# Patient Record
Sex: MALE | Race: WHITE | Employment: FULL TIME | URBAN - METROPOLITAN AREA
[De-identification: names, ages, dates, MRNs, and addresses within clinical notes are randomized per-mention and may not be internally consistent; named-entity substitution may affect disease eponyms.]

---

## 2023-10-06 ENCOUNTER — HOSPITAL ENCOUNTER (OUTPATIENT)
Dept: CV DIAGNOSTICS | Facility: HOSPITAL | Age: 58
Discharge: HOME | End: 2023-10-06
Attending: INTERNAL MEDICINE
Payer: COMMERCIAL

## 2023-10-06 DIAGNOSIS — R94.31 ABNORMAL EKG: ICD-10-CM

## 2023-10-06 PROCEDURE — 93306 TTE W/DOPPLER COMPLETE: CPT | Performed by: INTERNAL MEDICINE

## 2023-11-16 ENCOUNTER — OFFICE VISIT (OUTPATIENT)
Dept: SLEEP CENTER | Age: 58
End: 2023-11-16
Attending: INTERNAL MEDICINE
Payer: COMMERCIAL

## 2023-11-16 DIAGNOSIS — R06.83 SNORING: Primary | ICD-10-CM

## 2023-11-16 PROCEDURE — 95810 POLYSOM 6/> YRS 4/> PARAM: CPT

## 2024-06-09 ENCOUNTER — APPOINTMENT (OUTPATIENT)
Dept: CT IMAGING | Facility: HOSPITAL | Age: 59
End: 2024-06-09
Attending: EMERGENCY MEDICINE
Payer: COMMERCIAL

## 2024-06-09 ENCOUNTER — HOSPITAL ENCOUNTER (EMERGENCY)
Facility: HOSPITAL | Age: 59
Discharge: HOME | End: 2024-06-09
Attending: EMERGENCY MEDICINE
Payer: COMMERCIAL

## 2024-06-09 VITALS
SYSTOLIC BLOOD PRESSURE: 129 MMHG | RESPIRATION RATE: 18 BRPM | DIASTOLIC BLOOD PRESSURE: 89 MMHG | HEART RATE: 56 BPM | WEIGHT: 198.44 LBS | TEMPERATURE: 97 F | OXYGEN SATURATION: 98 %

## 2024-06-09 DIAGNOSIS — K42.9 REDUCIBLE UMBILICAL HERNIA: Primary | ICD-10-CM

## 2024-06-09 LAB
ALBUMIN SERPL-MCNC: 4.1 G/DL (ref 3.4–5)
ALBUMIN/GLOB SERPL: 1 {RATIO} (ref 1–2)
ALP LIVER SERPL-CCNC: 63 U/L
ALT SERPL-CCNC: 29 U/L
ANION GAP SERPL CALC-SCNC: 6 MMOL/L (ref 0–18)
AST SERPL-CCNC: 26 U/L (ref 15–37)
BASOPHILS # BLD AUTO: 0.04 X10(3) UL (ref 0–0.2)
BASOPHILS NFR BLD AUTO: 0.4 %
BILIRUB SERPL-MCNC: 0.6 MG/DL (ref 0.1–2)
BUN BLD-MCNC: 13 MG/DL (ref 9–23)
CALCIUM BLD-MCNC: 9.2 MG/DL (ref 8.5–10.1)
CHLORIDE SERPL-SCNC: 106 MMOL/L (ref 98–112)
CO2 SERPL-SCNC: 26 MMOL/L (ref 21–32)
CREAT BLD-MCNC: 0.91 MG/DL
EGFRCR SERPLBLD CKD-EPI 2021: 98 ML/MIN/1.73M2 (ref 60–?)
EOSINOPHIL # BLD AUTO: 0.03 X10(3) UL (ref 0–0.7)
EOSINOPHIL NFR BLD AUTO: 0.3 %
ERYTHROCYTE [DISTWIDTH] IN BLOOD BY AUTOMATED COUNT: 15.1 %
GLOBULIN PLAS-MCNC: 4.1 G/DL (ref 2.8–4.4)
GLUCOSE BLD-MCNC: 119 MG/DL (ref 70–99)
HCT VFR BLD AUTO: 46.5 %
HGB BLD-MCNC: 15.4 G/DL
IMM GRANULOCYTES # BLD AUTO: 0.05 X10(3) UL (ref 0–1)
IMM GRANULOCYTES NFR BLD: 0.5 %
LIPASE SERPL-CCNC: 35 U/L (ref 13–75)
LYMPHOCYTES # BLD AUTO: 1.35 X10(3) UL (ref 1–4)
LYMPHOCYTES NFR BLD AUTO: 14.1 %
MCH RBC QN AUTO: 27.3 PG (ref 26–34)
MCHC RBC AUTO-ENTMCNC: 33.1 G/DL (ref 31–37)
MCV RBC AUTO: 82.4 FL
MONOCYTES # BLD AUTO: 0.76 X10(3) UL (ref 0.1–1)
MONOCYTES NFR BLD AUTO: 7.9 %
NEUTROPHILS # BLD AUTO: 7.33 X10 (3) UL (ref 1.5–7.7)
NEUTROPHILS # BLD AUTO: 7.33 X10(3) UL (ref 1.5–7.7)
NEUTROPHILS NFR BLD AUTO: 76.8 %
OSMOLALITY SERPL CALC.SUM OF ELEC: 287 MOSM/KG (ref 275–295)
PLATELET # BLD AUTO: 232 10(3)UL (ref 150–450)
POTASSIUM SERPL-SCNC: 3.6 MMOL/L (ref 3.5–5.1)
PROT SERPL-MCNC: 8.2 G/DL (ref 6.4–8.2)
RBC # BLD AUTO: 5.64 X10(6)UL
SODIUM SERPL-SCNC: 138 MMOL/L (ref 136–145)
WBC # BLD AUTO: 9.6 X10(3) UL (ref 4–11)

## 2024-06-09 PROCEDURE — 96361 HYDRATE IV INFUSION ADD-ON: CPT

## 2024-06-09 PROCEDURE — 74177 CT ABD & PELVIS W/CONTRAST: CPT | Performed by: EMERGENCY MEDICINE

## 2024-06-09 PROCEDURE — 83690 ASSAY OF LIPASE: CPT | Performed by: EMERGENCY MEDICINE

## 2024-06-09 PROCEDURE — 96374 THER/PROPH/DIAG INJ IV PUSH: CPT

## 2024-06-09 PROCEDURE — 85025 COMPLETE CBC W/AUTO DIFF WBC: CPT | Performed by: EMERGENCY MEDICINE

## 2024-06-09 PROCEDURE — 99284 EMERGENCY DEPT VISIT MOD MDM: CPT

## 2024-06-09 PROCEDURE — 80053 COMPREHEN METABOLIC PANEL: CPT | Performed by: EMERGENCY MEDICINE

## 2024-06-09 PROCEDURE — 96375 TX/PRO/DX INJ NEW DRUG ADDON: CPT

## 2024-06-09 PROCEDURE — 99285 EMERGENCY DEPT VISIT HI MDM: CPT

## 2024-06-09 RX ORDER — ONDANSETRON 2 MG/ML
4 INJECTION INTRAMUSCULAR; INTRAVENOUS
Status: DISCONTINUED | OUTPATIENT
Start: 2024-06-09 | End: 2024-06-09

## 2024-06-09 RX ORDER — SODIUM CHLORIDE 9 MG/ML
INJECTION, SOLUTION INTRAVENOUS CONTINUOUS
Status: DISCONTINUED | OUTPATIENT
Start: 2024-06-09 | End: 2024-06-09

## 2024-06-09 RX ORDER — HYDROMORPHONE HYDROCHLORIDE 1 MG/ML
0.5 INJECTION, SOLUTION INTRAMUSCULAR; INTRAVENOUS; SUBCUTANEOUS EVERY 30 MIN PRN
Status: DISCONTINUED | OUTPATIENT
Start: 2024-06-09 | End: 2024-06-09

## 2024-06-09 NOTE — ED PROVIDER NOTES
Patient Seen in: [facility] Emergency Department      History     Chief Complaint   Patient presents with    Abdomen/Flank Pain     Stated Complaint: abdominal pain x 2hours    Subjective:   HPI    Patient has a known umbilical hernia.  He has a referral for a general surgeon but has not seen this doctor yet.  Today while sitting down in [place], he felt severe pain at the umbilicus.  He now feels generally nauseous and has generalized abdominal pain.  This started just about 2 hours ago.  Patient notes that he can usually push the hernia back into his bellybutton but is unable to do so today.    Objective:   No pertinent past medical history.            No pertinent past surgical history.              No pertinent social history.            Review of Systems    Positive for stated complaint: abdominal pain x 2hours  Other systems are as noted in HPI.  Constitutional and vital signs reviewed.      All other systems reviewed and negative except as noted above.    Physical Exam     ED Triage Vitals [06/09/24 1626]   BP (!) 170/93   Pulse 57   Resp 18   Temp 97.3 °F (36.3 °C)   Temp src    SpO2 98 %   O2 Device None (Room air)       Current Vitals:   Vital Signs  BP: 129/89  Pulse: 56  Resp: 18  Temp: 97.3 °F (36.3 °C)  MAP (mmHg): 100    Oxygen Therapy  SpO2: 98 %  O2 Device: None (Room air)            Physical Exam  General: The patient is awake, alert, conversant.   Eyes: sclera white.  Lids and lashes are normal.  Abdomen: Soft, nondistended.  There is diffuse tenderness in all quadrants with maximal discomfort overlying a protuberant umbilical hernia.  Extremities: Unremarkable.  Calves nonswollen, symmetric  Skin: Without rash  Neurologic:  Mental status as above.  Patient moves all extremities with good strength and coordination.           ED Course     Labs Reviewed   COMP METABOLIC PANEL (14) - Abnormal; Notable for the following components:       Result Value    Glucose 119 (*)     All other components  within normal limits   LIPASE - Normal   CBC WITH DIFFERENTIAL WITH PLATELET    Narrative:     The following orders were created for panel order CBC With Differential With Platelet.  Procedure                               Abnormality         Status                     ---------                               -----------         ------                     CBC W/ DIFFERENTIAL[[id number]]                              Final result                 Please view results for these tests on the individual orders.   CBC W/ DIFFERENTIAL                      MDM      Patient has a quite protuberant umbilical hernia with diffuse abdominal tenderness starting just 2 hours ago  With patient relaxed and with gentle pressure over the hernia, I did feel that it reduced.  Patient having some improvement of his symptoms as well.    CBC unremarkable  Metabolic panel unremarkable including LFTs and lipase    CT abdomen pelvis  I personally reviewed the actual radiographs themselves and my individual interpretation umbilical hernia with no bowel incarceration  radiologist's formal interpretation which I have reviewed  CONCLUSION:    1. There are a few scattered prominent small bowel loops with air-fluid levels that could be due to nonspecific enteritis or ileus.  Clinical correlation recommended.   2. Small umbilical hernia containing minimal fluid.       On repeat examination, the hernia remained reduced.  At this point, I believe patient may be safely discharged home.  I reviewed the results of workup with him.    I recommend:  Contact the surgeon you had been referred to first thing in the morning to arrange follow-up     Rest.  No lifting more than 8 pounds  Start over-the-counter fiber supplement like Metamucil or Citrucel with plenty of water and stool softener like Colace to prevent constipation  Tylenol or Motrin for pain     Patient requested a surgeon on staff at this hospital.  He was provided the on-call surgeons office  information                           Medical Decision Making      Disposition and Plan     Clinical Impression:  1. Reducible umbilical hernia         Disposition:  Discharge  6/9/2024  6:36 pm    Follow-up:  Karlo Soto MD  [address] [facility] [id number] [phone]    Follow up      Blake Simmons MD  [address] [facility] [id number] [phone]    Call  As needed          Medications Prescribed:  There are no discharge medications for this patient.

## 2024-06-09 NOTE — DISCHARGE INSTRUCTIONS
Contact the surgeon you had been referred to first thing in the morning to arrange follow-up    Rest.  No lifting more than 8 pounds  Start over-the-counter fiber supplement like Metamucil or Citrucel with plenty of water and stool softener like Colace to prevent constipation  Tylenol or Motrin for pain  You may apply a cool compress to the area 20 minutes at a time

## 2024-06-09 NOTE — ED INITIAL ASSESSMENT (HPI)
Pt arrives with abdominal pain. Pt has a known umbilical hernia. Increased pain since 1400. Nausea, denies vomiting. Last BM this am.

## 2024-06-13 ENCOUNTER — OFFICE VISIT (OUTPATIENT)
Facility: LOCATION | Age: 59
End: 2024-06-13
Payer: COMMERCIAL

## 2024-06-13 VITALS — OXYGEN SATURATION: 96 % | HEART RATE: 67 BPM | TEMPERATURE: 98 F

## 2024-06-13 DIAGNOSIS — K42.9 UMBILICAL HERNIA WITHOUT OBSTRUCTION AND WITHOUT GANGRENE: ICD-10-CM

## 2024-06-13 DIAGNOSIS — K40.20 NON-RECURRENT BILATERAL INGUINAL HERNIA WITHOUT OBSTRUCTION OR GANGRENE: Primary | ICD-10-CM

## 2024-06-13 PROCEDURE — 99244 OFF/OP CNSLTJ NEW/EST MOD 40: CPT | Performed by: SURGERY

## 2024-06-13 NOTE — H&P
New Patient Visit Note       Active Problems      1. Non-recurrent bilateral inguinal hernia without obstruction or gangrene    2. Umbilical hernia without obstruction and without gangrene        Chief Complaint   Chief Complaint   Patient presents with    New Patient     NP - 6/9 ER f/u Reducible umbilical hernia         History of Present Illness     The patient presents at the request of his primary care physician for evaluation of bilateral inguinal and umbilical hernia.  Patient has a known history of hernias and especially the umbilical hernia.  Usually the patient was able to reduce his umbilical hernia but recently he experienced pain discomfort and prominence of the umbilical hernia and he could not reduce the hernia.  The patient presented to the emergency room with a hernia was reduced after CT scan of the abdomen pelvis was completed.  Imaging confirmed small fat-containing umbilical hernia as well as bilateral inguinal hernias.  I reviewed the images I concur with radiologist interpretation.  I discussed the findings with the patient in context of his presenting symptoms.    The patient denies fever, chills, chest pain, shortness of breath, dyspnea. The patient also denies hematemesis, melena, or hematochezia. The patient denies change in bowel or bladder habits. There is no complaint of hematuria or dysuria.    I discussed the risk, benefits, alternatives of surgery.  I discussed the anticipated postoperative recovery including dietary, activity, exercise, and lifestyle recommendations.  The patient's questions regarding surgical procedure were answered and the patient voiced understanding of the care plan.    Allergies  Darryn is allergic to pennsaid.    Past Medical / Surgical / Social / Family History    The past medical and past surgical history have been reviewed by me today.    History reviewed. No pertinent past medical history.  History reviewed. No pertinent surgical history.    The family  history and social history have been reviewed by me today.    History reviewed. No pertinent family history.  Social History     Socioeconomic History    Marital status:    Substance and Sexual Activity    Alcohol use: Never    Drug use: Never      No current outpatient medications on file.      Review of Systems  The Review of Systems has been reviewed by me during today.  Review of Systems    Physical Findings   Pulse 67   Temp 97.6 °F (36.4 °C)   SpO2 96%   Physical Exam  Vitals and nursing note reviewed.   Constitutional:       General: He is not in acute distress.     Appearance: He is well-developed.   HENT:      Head: Normocephalic and atraumatic.   Eyes:      General: No scleral icterus.     Pupils: Pupils are equal, round, and reactive to light.   Neck:      Vascular: No JVD.      Trachea: No tracheal deviation.   Cardiovascular:      Rate and Rhythm: Normal rate and regular rhythm.   Pulmonary:      Effort: Pulmonary effort is normal. No respiratory distress.      Breath sounds: No stridor.   Abdominal:      General: Bowel sounds are normal. There is no distension.      Palpations: Abdomen is soft. Abdomen is not rigid. There is no mass.      Tenderness: There is no abdominal tenderness. There is no guarding or rebound. Negative signs include Mathew's sign and McBurney's sign.      Hernia: A hernia is present. Hernia is present in the umbilical area, left inguinal area and right inguinal area.       Genitourinary:     Penis: Normal. No phimosis, paraphimosis, hypospadias, erythema, tenderness, discharge, swelling or lesions.       Testes: Normal.         Right: Mass, tenderness, swelling, testicular hydrocele or varicocele not present. Right testis is descended. Cremasteric reflex is present.          Left: Mass, tenderness, swelling, testicular hydrocele or varicocele not present. Left testis is descended. Cremasteric reflex is present.       Epididymis:      Right: Normal.      Left: Normal.    Musculoskeletal:         General: Normal range of motion.      Cervical back: Normal range of motion and neck supple.   Skin:     General: Skin is warm and dry.   Neurological:      Mental Status: He is alert and oriented to person, place, and time.   Psychiatric:         Behavior: Behavior normal.             Assessment   1. Non-recurrent bilateral inguinal hernia without obstruction or gangrene    2. Umbilical hernia without obstruction and without gangrene          Plan     The patient will be scheduled for robotic repair of bilateral inguinal hernias with mesh and umbilical hernia repair-with mesh as well.    The stevie-operative care plan was discussed with the patient, who voices understanding.  Activity and lifting recommendations were discussed in length.     The risks, benefits, and alternatives to the procedure were explained to the patient.  The risks explained include, but are not limited to, bleeding, infection, pain wound complications, recurrence, incorrect diagnosis, injury to adjacent organs and structures. We also discussed the possibile need for further therapeutic, diagnostic, or surgical intervention.  The patient voiced understanding, and after all questions were answered to the patient's satisfaction, the patient provided willing and informed consent to proceed.     No orders of the defined types were placed in this encounter.      Imaging & Referrals   None    Follow Up  No follow-ups on file.    Scotty Duffy MD

## 2024-09-09 ENCOUNTER — TELEPHONE (OUTPATIENT)
Facility: LOCATION | Age: 59
End: 2024-09-09

## 2024-09-09 NOTE — TELEPHONE ENCOUNTER
SHERRI JUNG Patient  Member ID  [id number]    Date of Birth  1965-09-28    Gender  NA    Transaction Type  Outpatient Authorization    Organization  [facility]    Payer  [facility] logo     Certificate Information  Reference Number  [id number]    Status  NO ACTION REQUIRED    Message  Requested Service does not require preauthorization. We would strongly encourage you to check benefits for this service.    Member Information  Patient Name  SHERRI JUNG    Patient Date of Birth  1965-09-28    Member ID  [id number]    Relationship to Subscriber  Self    Subscriber Name  SHERRI JUNG    Requesting Provider     Name  HUMBERTOJANETS ROSA    NPI  [id number]    Tax Id  [id number]    Specialty  [id number]  Provider Role  Provider    Address  [address] [place], [place] [address]    Phone  [phone]  Fax  [phone]    Contact Name  ROCÍO MARIA    Service Information  Service Type  2 - Surgical    Place of Service  22 - [facility]    Service From - To Date  2024-09-23 - 2024-12-31    Level of Service  Elective    Diagnosis Code 1  K429 - Umbilical hernia without obstruction or gangrene    Diagnosis Code 2   - Unil inguinal hernia w/o obst or gangr not spcf as recur    Procedure Code 1 (CPT/HCPCS)  86740 - LAP ING HERNIA REPAIR INIT    Quantity  1 Units    Status  NO ACTION REQUIRED    Procedure Code 2 (CPT/HCPCS)  98526 - RPR AA HRN 1ST < 3 CM RDC    Quantity  1 Units    Status  NO ACTION REQUIRED

## 2024-09-23 NOTE — ANESTHESIA PROCEDURE NOTES
Airway  Date/Time: 9/23/2024 9:33 AM  Urgency: elective      General Information and Staff    Patient location during procedure: OR  Anesthesiologist: Calixto Awan MD  Resident/CRNA: Ryan Martines CRNA  Performed: CRNA   Performed by: Ryan Martines CRNA  Authorized by: Calixto Awan MD      Indications and Patient Condition  Indications for airway management: anesthesia  Sedation level: deep  Preoxygenated: yes  Patient position: sniffing  Mask difficulty assessment: 1 - vent by mask    Final Airway Details  Final airway type: endotracheal airway      Successful airway: ETT  Cuffed: yes   Successful intubation technique: direct laryngoscopy  Endotracheal tube insertion site: oral  Blade: Harish  Blade size: #4  ETT size (mm): 8.0    Cormack-Lehane Classification: grade I - full view of glottis  Placement verified by: capnometry   Measured from: lips  ETT to lips (cm): 22  Number of attempts at approach: 1

## 2024-09-23 NOTE — ANESTHESIA PREPROCEDURE EVALUATION
PRE-OP EVALUATION    Patient Name: Darryn Almeida    Admit Diagnosis: Non-recurrent bilateral inguinal hernia without obstruction or gangrene [K40.20]  Umbilical hernia without obstruction and without gangrene [K42.9]    Pre-op Diagnosis: Non-recurrent bilateral inguinal hernia without obstruction or gangrene [K40.20]  Umbilical hernia without obstruction and without gangrene [K42.9]    ROBOTIC BILATERAL LAPAROSCOPIC INGUINAL HERNIA REPAIR WITH MESH, UMBILICAL HERNIORRHAPHY WITH MESH    Anesthesia Procedure: ROBOTIC BILATERAL LAPAROSCOPIC INGUINAL HERNIA REPAIR WITH MESH, UMBILICAL HERNIORRHAPHY WITH MESH (Abdomen)    Surgeons and Role:     * Scotty Duffy MD - Primary    Pre-op vitals reviewed.  Temp: 97.7 °F (36.5 °C)  Pulse: 69  Resp: 16  BP: 137/86  SpO2: 99 %  Body mass index is 28.59 kg/m².    Current medications reviewed.  Hospital Medications:  • [Transfer Hold] acetaminophen (Tylenol Extra Strength) tab 1,000 mg  1,000 mg Oral Once   • [Transfer Hold] scopolamine (Transderm-Scop) 1 MG/3DAYS patch 1 patch  1 patch Transdermal Once   • lactated ringers infusion   Intravenous Continuous   • [COMPLETED] heparin (Porcine) 5000 UNIT/ML injection 5,000 Units  5,000 Units Subcutaneous Once   • ceFAZolin (Ancef) 2g in 10mL IV syringe premix  2 g Intravenous Once       Outpatient Medications:     No medications prior to admission.       Allergies: Penicillins      Anesthesia Evaluation    Patient summary reviewed.    Anesthetic Complications  (-) history of anesthetic complications         GI/Hepatic/Renal    Negative GI/hepatic/renal ROS.                             Cardiovascular    Negative cardiovascular ROS.  ECG reviewed.  Exercise tolerance: good     MET: >4                                           Endo/Other    Negative endo/other ROS.                              Pulmonary    Negative pulmonary ROS.                       Neuro/Psych                                  Past Surgical History:    Procedure Laterality Date   • Spine surgery procedure unlisted  2011     Social History     Socioeconomic History   • Marital status:    Tobacco Use   • Smoking status: Never   • Smokeless tobacco: Never   Vaping Use   • Vaping status: Never Used   Substance and Sexual Activity   • Alcohol use: Yes     Comment: 1.MONTH   • Drug use: Never     History   Drug Use Unknown     Available pre-op labs reviewed.               Airway      Mallampati: II  Mouth opening: >3 FB  TM distance: > 6 cm  Neck ROM: full Cardiovascular    Cardiovascular exam normal.  Rhythm: regular  Rate: normal     Dental             Pulmonary    Pulmonary exam normal.  Breath sounds clear to auscultation bilaterally.               Other findings        ASA: 1   Plan: general  NPO status verified and patient meets guidelines.    Post-procedure pain management plan discussed with surgeon and patient.      Plan/risks discussed with: patient            Present on Admission:  **None**

## 2024-09-23 NOTE — H&P
New Patient Visit Note         History of Present Illness     The patient presents at the request of his primary care physician for evaluation of bilateral inguinal and umbilical hernia.  Patient has a known history of hernias and especially the umbilical hernia.  Usually the patient was able to reduce his umbilical hernia but recently he experienced pain discomfort and prominence of the umbilical hernia and he could not reduce the hernia.  The patient presented to the emergency room with a hernia was reduced after CT scan of the abdomen pelvis was completed.  Imaging confirmed small fat-containing umbilical hernia as well as bilateral inguinal hernias.  I reviewed the images I concur with radiologist interpretation.  I discussed the findings with the patient in context of his presenting symptoms.    The patient denies fever, chills, chest pain, shortness of breath, dyspnea. The patient also denies hematemesis, melena, or hematochezia. The patient denies change in bowel or bladder habits. There is no complaint of hematuria or dysuria.    I discussed the risk, benefits, alternatives of surgery.  I discussed the anticipated postoperative recovery including dietary, activity, exercise, and lifestyle recommendations.  The patient's questions regarding surgical procedure were answered and the patient voiced understanding of the care plan.    Allergies  Darryn is allergic to penicillins.    Past Medical / Surgical / Social / Family History    The past medical and past surgical history have been reviewed by me today.    Past Medical History:    Back problem     Past Surgical History:   Procedure Laterality Date    Spine surgery procedure unlisted  2011       The family history and social history have been reviewed by me today.    History reviewed. No pertinent family history.  Social History     Socioeconomic History    Marital status:    Tobacco Use    Smoking status: Never    Smokeless tobacco: Never   Vaping Use     Vaping status: Never Used   Substance and Sexual Activity    Alcohol use: Yes     Comment: 1.MONTH    Drug use: Never      No current outpatient medications on file.      Review of Systems  The Review of Systems has been reviewed by me during today.  Review of Systems    Physical Findings   /86 (BP Location: Right arm)   Pulse 69   Temp 97.7 °F (36.5 °C) (Temporal)   Resp 16   Ht 71\"   Wt 205 lb (93 kg)   SpO2 99%   BMI 28.59 kg/m²   Physical Exam  Vitals and nursing note reviewed.   Constitutional:       General: He is not in acute distress.     Appearance: He is well-developed.   HENT:      Head: Normocephalic and atraumatic.   Eyes:      General: No scleral icterus.     Pupils: Pupils are equal, round, and reactive to light.   Neck:      Vascular: No JVD.      Trachea: No tracheal deviation.   Cardiovascular:      Rate and Rhythm: Normal rate and regular rhythm.   Pulmonary:      Effort: Pulmonary effort is normal. No respiratory distress.      Breath sounds: No stridor.   Abdominal:      General: Bowel sounds are normal. There is no distension.      Palpations: Abdomen is soft. Abdomen is not rigid. There is no mass.      Tenderness: There is no abdominal tenderness. There is no guarding or rebound. Negative signs include Mathew's sign and McBurney's sign.      Hernia: A hernia is present. Hernia is present in the umbilical area, left inguinal area and right inguinal area.       Genitourinary:     Penis: Normal. No phimosis, paraphimosis, hypospadias, erythema, tenderness, discharge, swelling or lesions.       Testes: Normal.         Right: Mass, tenderness, swelling, testicular hydrocele or varicocele not present. Right testis is descended. Cremasteric reflex is present.          Left: Mass, tenderness, swelling, testicular hydrocele or varicocele not present. Left testis is descended. Cremasteric reflex is present.       Epididymis:      Right: Normal.      Left: Normal.   Musculoskeletal:          General: Normal range of motion.      Cervical back: Normal range of motion and neck supple.   Skin:     General: Skin is warm and dry.   Neurological:      Mental Status: He is alert and oriented to person, place, and time.   Psychiatric:         Behavior: Behavior normal.             Assessment     Bilateral Inguinal hernias  Umbilical hernia        Plan     The patient will be scheduled for robotic repair of bilateral inguinal hernias with mesh and umbilical hernia repair-with mesh as well.    The stevie-operative care plan was discussed with the patient, who voices understanding.  Activity and lifting recommendations were discussed in length.     The risks, benefits, and alternatives to the procedure were explained to the patient.  The risks explained include, but are not limited to, bleeding, infection, pain wound complications, recurrence, incorrect diagnosis, injury to adjacent organs and structures. We also discussed the possibile need for further therapeutic, diagnostic, or surgical intervention.  The patient voiced understanding, and after all questions were answered to the patient's satisfaction, the patient provided willing and informed consent to proceed.     Scotty Duffy MD

## 2024-09-23 NOTE — BRIEF OP NOTE
Pre-Operative Diagnosis: Non-recurrent bilateral inguinal hernia without obstruction or gangrene [K40.20]  Umbilical hernia without obstruction and without gangrene [K42.9]     Post-Operative Diagnosis: Non-recurrent bilateral inguinal hernia without obstruction or gangrene [K40.20]Umbilical hernia without obstruction and without gangrene [K42.9]      Procedure Performed:   ROBOTIC BILATERAL LAPAROSCOPIC INGUINAL HERNIA REPAIR WITH MESH, VENTRAL HERNIA REPAIR WITH MESH, UMBILICAL HERNIORRHAPHY    Surgeons and Role:     * Scotty Duffy MD - Primary    Assistant(s):  PA: Halle Meglar PA     Surgical Findings: Bilateral incarcerated indirect inguinal hernias with large lipomas in the inguinal canal.  Recurrent, incarcerated umbilical hernia with omentum in the hernia sac.  Aperture 3 cm diameter.     Specimen: Hernia sac and contents     Estimated Blood Loss: Blood Output: 15 mL (9/23/2024 11:50 AM)      Dictation Number:  [id number]    Scotty Duffy MD  9/23/2024  11:57 AM

## 2024-09-23 NOTE — ANESTHESIA POSTPROCEDURE EVALUATION
[facility]    Darryn Almeida Patient Status:  Hospital Outpatient Surgery   Age/Gender 58 year old male MRN [id number]   Location [facility] SURGERY Attending Scotty Duffy MD   Hosp Day # 0 PCP Karlo Soto MD       Anesthesia Post-op Note    ROBOTIC BILATERAL LAPAROSCOPIC INGUINAL HERNIA REPAIR WITH MESH, UMBILICAL HERNIORRHAPHY WITH MESH    Procedure Summary       Date: 09/23/24 Room / Location:  MAIN OR 09 / [facility] MAIN OR    Anesthesia Start: 0927 Anesthesia Stop: 1214    Procedure: ROBOTIC BILATERAL LAPAROSCOPIC INGUINAL HERNIA REPAIR WITH MESH, UMBILICAL HERNIORRHAPHY WITH MESH (Abdomen) Diagnosis:       Non-recurrent bilateral inguinal hernia without obstruction or gangrene      Umbilical hernia without obstruction and without gangrene      (Non-recurrent bilateral inguinal hernia without obstruction or gangrene [K40.20]Umbilical hernia without obstruction and without gangrene [K42.9])    Surgeons: Scotty Duffy MD Anesthesiologist: Calixto Awan MD    Anesthesia Type: general ASA Status: 1            Anesthesia Type: general    Vitals Value Taken Time   /77 09/23/24 1216   Temp 98.2 09/23/24 1219   Pulse 78 09/23/24 1219   Resp 16 09/23/24 1219   SpO2 96 % 09/23/24 1219   Vitals shown include unfiled device data.    Patient Location: PACU    Anesthesia Type: general    Airway Patency: patent    Postop Pain Control: adequate    Mental Status: mildly sedated but able to meaningfully participate in the post-anesthesia evaluation    Nausea/Vomiting: none    Cardiopulmonary/Hydration status: stable euvolemic    Complications: no apparent anesthesia related complications    Postop vital signs: stable    Dental Exam: Unchanged from Preop    Patient to be discharged from PACU when criteria met.

## 2024-09-23 NOTE — DISCHARGE INSTRUCTIONS
Home Care Instructions  Hernia Repair  Dr Scotty Duffy    MEDICATIONS  For post-operative pain control the medication is usually Oxycodone. This is a narcotic and is best taken by starting with one tablet and repeating every four to six hours as needed. If the patient does not feel they need the narcotics they shouldn’t take them. If the pain is severe the patient may take up to two pills every four hours. If a minor supplement is necessary in addition to the narcotics please supplement with Tylenol and Advil (ibuprofen) or Motrin. Take care not to exceed 4 g (4000 mg) of Tylenol per day. Please ask your surgeon before resuming blood thinners such as aspirin, plavix or coumadin. All other home medications may be resumed as scheduled.    DIET  The patient may resume a general diet immediately. This is not a good time to eat excessively. The patient should eat in moderation and stick with foods the patient feels are easy to digest. There should be no alcohol consumption in the immediate recover time period or within six hours of taking narcotics.    WOUND CARE  The top dressing may be removed the day after surgery. This includes the gauze, tape and band-aids if they are present. Do not remove the steri-strips or butterfly tapes that are white and adherent to the skin. The steri-strips will eventually peel up at the ends and at this point they may be removed. This is usually seven to ten days after surgery. The patient may shower the day after surgery. There is no need to cover the incisions and all top gauze type dressing should be removed prior to showering. Soap can get on the wounds but do not scrub over the wounds. No hair dye or chemicals of any kind should get in the wounds. Avoid tub baths for two weeks. If visible sutures or staples are present they will be removed in the office by the surgeon or nurse. Most wounds will be closed with dissolving suture underneath the skin. These sutures will dissolve on  their own.    ACTIVITY  Every day the patient should be up, showered and dressed. Each day the patient should be up and around the house. The patient should not lie in bed and should not stay in pajamas. We count on the patient being up, coughing, walking and deep breathing to avoid pneumonia and blood clots in the legs. Once a day the patient should get out of the house and go shopping, go to the mall, the hardware store, the movies or a restaurant. The patient may ride in a car but should not drive the car for at least one week. Patients should be off narcotics for at least 8 hours prior to being the . The average time off work is 10 to 14 days; most adults will be seeing the surgeon prior to returning to work. Patients may go up and down stairs and lift up to five pounds but no bending, pushing or pulling. Nothing called work or exercise until the follow up visit. No ‘stair-master’ poser walking, jogging or workout until the follow up visit. Patients should seek further activity limits at the time of their appointment. If the patient is unable to urinate and feels a painful and full bladder call us immediately.    APPOINTMENT  Please call our office today for an appointment within five to ten days of discharge Any fever greater than 100.5, chills, nausea, vomiting, or severe diarrhea please call our office. If the wound turns red, hot, swollen, becomes increasingly painful, or drains pus call us immediately at [phone]. For life threatening emergencies call 911. For non-emergent care please call our office after 9:30 a.m. Monday through Saturday. The number listed above is our office number, our phone automatically switches to out answering service if we are not there. Please do not use the emergency room for nonurgent care.      Thank you for entrusting me with your care.     You received a drug called Toradol which is an Anti Inflammatory at: 11:45 am     Do not take any Anti Inflammatory like  Motrin, Advil, Aleve or Ibuprofen until after: 5:45 pm   Please report any suspected allergic reactions or bleeding issues to your doctor

## 2024-09-23 NOTE — OPERATIVE REPORT
[facility]    PATIENT'S NAME: SHERRI PRINCE   ATTENDING PHYSICIAN: Scotty Duffy M.D.   OPERATING PHYSICIAN: Scotty Duffy M.D.   PATIENT ACCOUNT#:   [id number]    LOCATION:  PACU  PACU 6 [facility]  MEDICAL RECORD #:   [id number]       YOB: 1965  ADMISSION DATE:       09/23/2024      OPERATION DATE:  09/23/2024    OPERATIVE REPORT    PREOPERATIVE DIAGNOSIS:    1.   Bilateral incarcerated inguinal hernia.  2.   Recurrent incarcerated umbilical hernia.  POSTOPERATIVE DIAGNOSIS:    1.   Bilateral incarcerated inguinal hernia.  2.   Recurrent incarcerated umbilical hernia.  PROCEDURE:    1.   Robotic repair of incarcerated bilateral inguinal hernias with mesh.  2.   Robotic ventral/umbilical hernia repair with mesh (3 cm diameter aperture).     ASSISTANT:  Halle Melgar PA-C    ANESTHESIA:  General endotracheal anesthesia.     ANESTHESIOLOGIST:  Mr. Bobbi CRNA    BLOOD LOSS:  Approximately 15 mL.    COMPLICATIONS:  None apparent.    SPECIMENS:  Umbilical hernia sac and contents (omentum).    DISPOSITION:  The patient awakened from anesthesia and brought to Recovery in stable condition.  He tolerated the procedure without apparent complication.      Needle, sponge, instrument counts were correct at the end of the procedure.  Preprocedure antibiotic and DVT prophylaxis administered per protocol.  Time-out was performed prior to initiating the procedure identifying the correct patient, procedure, surgeon.    FINDINGS:  The patient had bilateral incarcerated indirect inguinal hernias with large lipomas in the inguinal canal.  There was a recurrent incarcerated umbilical hernia with omentum in the hernia sac.  The aperture of the ventral umbilical hernia is approximately 3 cm diameter.    OPERATIVE TECHNIQUE:  The patient was brought to the operating room and after induction of general endotracheal anesthesia, the anesthesiologist performed bilateral TAP blocks after which the abdomen  was prepped and draped in usual sterile fashion.  A periumbilical incision was created using scalpel to incise the skin full thickness.  Electrocautery was then used to achieve hemostasis and dissect the tissues down to the level of fascia.  The umbilical skin was  from the hernia sac.  There was dense adherence which required particular attention to dissection.  Incarcerated omentum was irreducible; therefore, the hernia sac was opened at the level of the fascia circumferentially using cautery.  The omentum was then divided with 0 Vicryl tie.  Specimen was removed from the operative field and submitted to Pathology for evaluation.  Hemostasis was achieved.  Inspection of the umbilical skin revealed a small defect that was repaired using 3-0 Vicryl suture.  Next, 2 stay sutures of 0 Vicryl were placed to the left and the right of the hernia opening.  Norma trocar was then inserted into the abdomen.  Pneumoperitoneum was established in standard fashion.  Camera was now placed in the abdomen.  Circumferential inspection of the abdomen revealed no other acute pathology except for the incarcerated inguinal hernias with findings as noted above.  On the patient's left-hand side, one 8 mm trocar was placed lateral to the umbilical incision under direct vision and a trocar was placed.  To the patient's right, three 8 mm incisions were created through which 8 mm robotic trocars were placed under direct vision.  Attention was initially turned toward repair of bilateral inguinal hernias.  The patient was placed in Trendelenburg position.  The robot was docked.  The instruments were placed.  Bilateral inguinal hernias were repaired in preperitoneal fashion in similar manner.  Peritoneal flap was dissected free, incised, and the dissection was carried down to the level of the symphysis pubis, iliopubic tract, and Fredi's ligament medially.  Laterally, the dissection was carried just past the internal ring.  A generous  preperitoneal pocket was created with visualization of the iliac vessels.  The hernia sac was then reduced and  from the cord structures using blunt and sharp dissection.  Once the hernia sac was completely reduced, a large lipoma of the inguinal canal (present bilaterally) was bluntly and sharply dissected free and reduced into the preperitoneal space.  Next, ProGrip mesh was placed under direct vision with adequate coverage medially, laterally, inferiorly, and superiorly of all potential hernia spaces.  The peritoneal flap was then closed using running 3-0 V-Loc suture.  As mentioned, the contralateral inguinal hernia was repaired in similar manner with similar findings.  At this point, instruments were removed.  The robot was re-docked on the patient's right side for repair of the ventral/umbilical hernia.  The Norma trocar balloon was deflated after instruments were placed.  Trocars withdrawn to above the fascia.  The fascia was reapproximated using running 0 V-Loc suture.  Next, a 12 cm round intraperitoneal Synecor mesh was placed and secured circumferentially using multiple running 3-0 V-Loc suture.  Once this was completed, all needles were removed.  At this point, needle, sponge, and instrument counts were correct.  Hemostasis was confirmed.  Robotic instruments were removed.  Pneumoperitoneum was released and trocars were removed under vision.  The umbilical skin was now secured to the fascia using 2-0 Vicryl, 3-0 Vicryl suture.  The subcutaneous tissue was cleansed, irrigated, and all skin incisions were then closed using subcuticular 4-0 Monocryl.  Dermabond was used to seal all the incisions after which the patient was awakened from anesthesia and brought to the recovery room in stable condition having tolerated the procedure without apparent complication.  Needle, sponge, and instrument counts were correct at the end of the procedure and operative findings were discussed the patient's family  immediately upon conclusion of surgery.    Dictated By Scotty Duffy M.D.  d: 09/23/2024 12:17:49  t: 09/23/2024 15:30:26  [facility] [id number]  PP/

## 2024-10-08 NOTE — PROGRESS NOTES
Follow Up Visit Note       Active Problems      1. Postoperative state    2. Left inguinal hernia    3. Right inguinal hernia    4. Umbilical hernia without obstruction and without gangrene          Chief Complaint   Chief Complaint   Patient presents with    Post-Op      PO 9/23/24 PBP, ROBOTIC BILATERAL LAPAROSCOPIC INGUINAL HERNIA REPAIR WITH MESH,  VENTRAL HERNIA REPAIR WITH MESH, UMBILICAL HERNIORRHAPHY         History of Present Illness  Darryn is a 59 year old male who underwent robotic bilateral laparoscopic inguinal hernia repair and ventral hernia repair with Dr. Duffy on 9/23/2024. He presents to clinic today for follow up evaluation.    He reports doing well postoperatively. He reports mild incisional pain. He reports he is not taking any pain medication at this time. He denies nausea or vomiting with eating. He reports constipation initially following surgery but states it improved with stool softener. He reports dysuria for the first day after surgery but states this resolved. He denies fever or chills.     Specimen pathology as below:  Soft tissue, umbilical hernia, excision:  -Mesothelial lined fibroadipose tissue, consistent with hernia sac.  Allergies  Darryn is allergic to penicillins.    Past Medical / Surgical / Social / Family History    The past medical and past surgical history have been reviewed by me today.    Past Medical History:    Back problem     Past Surgical History:   Procedure Laterality Date    Spine surgery procedure unlisted  2011       The family history and social history have been reviewed by me today.    History reviewed. No pertinent family history.  Social History     Socioeconomic History    Marital status:    Tobacco Use    Smoking status: Never    Smokeless tobacco: Never   Vaping Use    Vaping status: Never Used   Substance and Sexual Activity    Alcohol use: Yes     Comment: 1.MONTH    Drug use: Never      No current outpatient medications on file.     Review  of Systems  The Review of Systems has been reviewed by me during today.  Review of Systems   Constitutional:  Negative for appetite change, chills, fatigue and fever.   Gastrointestinal:  Positive for constipation. Negative for abdominal distention, abdominal pain, diarrhea, nausea and vomiting.   Genitourinary:  Positive for dysuria. Negative for difficulty urinating, frequency and hematuria.   Skin:  Negative for rash and wound.        Physical Findings   /80   Pulse 78   Temp 97.1 °F (36.2 °C)   Resp 16   Ht 71\"   Wt 205 lb (93 kg)   SpO2 96%   BMI 28.59 kg/m²   Physical Exam  Vitals reviewed.   Constitutional:       General: He is not in acute distress.     Appearance: Normal appearance. He is not ill-appearing.   HENT:      Head: Normocephalic and atraumatic.   Eyes:      General: No scleral icterus.     Conjunctiva/sclera: Conjunctivae normal.   Pulmonary:      Effort: Pulmonary effort is normal. No respiratory distress.   Abdominal:      General: There is no distension.      Palpations: Abdomen is soft.      Tenderness: There is no abdominal tenderness. There is no guarding or rebound.          Comments: Abdomen soft, non-distended, non-tender to palpation. Laparoscopic incision x 5 are clean, dry and healing appropriately. No surrounding erythema or drainage. No fluctuance to palpation. No signs of infection. Healing ecchymosis noted throughout abdomen. Dermabond in place.      Skin:     General: Skin is warm and dry.      Coloration: Skin is not jaundiced.   Neurological:      Mental Status: He is alert.   Psychiatric:         Mood and Affect: Mood normal.         Behavior: Behavior normal.          Assessment   1. Postoperative state    2. Left inguinal hernia    3. Right inguinal hernia    4. Umbilical hernia without obstruction and without gangrene        Plan   The patient is doing well postoperatively.  Continue Diet as tolerated.  Tylenol and Ibuprofen as needed for pain control. Can  apply cold compress for comfort  I discussed with the patient that the dysuria he experienced on POD 1 is likely related to the william catheter placed at time of surgery. No further work up needed at this time as symptoms have resolved.   Continue local wound care, soap and water to the incisions.   Pathology discussed with the patient vis Mychart message.   Recommend Lifting restrictions of no greater than 15-20 lbs for 4 weeks postoperatively  All the patient's questions and concerns were addressed. They voiced understanding and are in agreement with the plan     Follow Up  They may follow up with [facility] general surgery on an as-needed basis.      Marcia Posey PA-C

## 2024-12-03 NOTE — PROGRESS NOTES
Post Operative Visit Note       Active Problems  1. Non-recurrent bilateral inguinal hernia without obstruction or gangrene    2. Umbilical hernia without obstruction and without gangrene         Chief Complaint   Chief Complaint   Patient presents with    Post-Op     PO 9/23/2024, ROBOTIC BILATERAL LAPAROSCOPIC INGUINAL HERNIA REPAIR WITH MESH,  VENTRAL HERNIA REPAIR WITH MESH, UMBILICAL HERNIORRHAPHY          History of Present Illness     The patient presents for postoperative care following robotic bilateral inguinal hernia repair with mesh and robotic ventral herniorrhaphy with mesh.  The patient reports no pain, discomfort or other physical limitations.  The patient is expanding his daily activities to near normal levels without issue.    The patient denies fever, chills, chest pain, shortness of breath, dyspnea. The patient also denies hematemesis, melena, or hematochezia. The patient denies change in bowel or bladder habits. There is no complaint of hematuria or dysuria.    Allergies  Darryn is allergic to penicillins.    Past Medical / Surgical / Social / Family History    The past medical and past surgical history have been reviewed by me today.     Past Medical History:    Back problem     Past Surgical History:   Procedure Laterality Date    Spine surgery procedure unlisted  2011       The family history and social history have been reviewed by me today.    History reviewed. No pertinent family history.  Social History     Socioeconomic History    Marital status:    Tobacco Use    Smoking status: Never    Smokeless tobacco: Never   Vaping Use    Vaping status: Never Used   Substance and Sexual Activity    Alcohol use: Yes     Comment: 1.MONTH    Drug use: Never      No current outpatient medications on file.      Review of Systems  The Review of Systems has been reviewed by me during today.  Review of Systems   Constitutional: Negative.    Respiratory: Negative.     Cardiovascular: Negative.     Gastrointestinal: Negative.    Skin: Negative.    Neurological: Negative.        Physical Findings   BP (!) 134/91   Pulse 62   Temp 97.3 °F (36.3 °C)   SpO2 97%   Physical Exam  Constitutional:       Appearance: He is well-developed.   Cardiovascular:      Rate and Rhythm: Normal rate and regular rhythm.      Heart sounds: Normal heart sounds.   Pulmonary:      Effort: Pulmonary effort is normal.      Breath sounds: Normal breath sounds.   Abdominal:      General: Bowel sounds are normal. There is no distension.      Palpations: Abdomen is soft. There is no mass.      Tenderness: There is no abdominal tenderness.      Hernia: No hernia is present. There is no hernia in the left inguinal area or right inguinal area.   Genitourinary:     Penis: Normal.       Testes: Normal.      Epididymis:      Right: Normal.      Left: Normal.       Skin:     General: Skin is warm and dry.   Neurological:      Mental Status: He is alert and oriented to person, place, and time.      Deep Tendon Reflexes: Reflexes are normal and symmetric.             Assessment   1. Non-recurrent bilateral inguinal hernia without obstruction or gangrene    2. Umbilical hernia without obstruction and without gangrene          Plan     The patient is recovering nicely following robotic repair of bilateral inguinal hernia with mesh and ventral hernia repair with mesh.    The anticipated postoperative recovery was discussed with the patient in detail.    Dietary, activity, and exercise recommendations along with restrictions were discussed with the patient during today's visit.    Wound care instructions were discussed during today's visit.    The patient will return to my attention on an as needed basis.    The patient is encouraged to continue seeing the primary care physician for ongoing medical needs.    The patient was given ample opportunity to ask questions.  The patient's questions were answered in detail and to the patient's satisfaction.   The patient voiced understanding of the postoperative care plan.           No orders of the defined types were placed in this encounter.      Imaging & Referrals   None    Follow Up  No follow-ups on file.    Scotty Duffy MD

## (undated) NOTE — LETTER
24    Patient: Darryn Gonzáles  : 1965 Visit date: 2024    Dear  Karlo Soto MD    Thank you for referring Darryn Gonzáles to my practice.  Please find my assessment and plan below.     Assessment   1. Non-recurrent bilateral inguinal hernia without obstruction or gangrene    2. Umbilical hernia without obstruction and without gangrene          Plan     The patient will be scheduled for robotic repair of bilateral inguinal hernias with mesh and umbilical hernia repair-with mesh as well.    The stevie-operative care plan was discussed with the patient, who voices understanding.  Activity and lifting recommendations were discussed in length.     The risks, benefits, and alternatives to the procedure were explained to the patient.  The risks explained include, but are not limited to, bleeding, infection, pain wound complications, recurrence, incorrect diagnosis, injury to adjacent organs and structures. We also discussed the possibile need for further therapeutic, diagnostic, or surgical intervention.  The patient voiced understanding, and after all questions were answered to the patient's satisfaction, the patient provided willing and informed consent to proceed.      Sincerely,       Scotty Duffy MD   CC:   No Recipients

## (undated) NOTE — LETTER
24    Patient: Darryn Almeida  : 1965 Visit date: 12/3/2024    Dear  Karlo Soto MD    Thank you for referring Darryn Almeida to my practice.  Please find my assessment and plan below.    Assessment   1. Non-recurrent bilateral inguinal hernia without obstruction or gangrene    2. Umbilical hernia without obstruction and without gangrene          Plan     The patient is recovering nicely following robotic repair of bilateral inguinal hernia with mesh and ventral hernia repair with mesh.    The anticipated postoperative recovery was discussed with the patient in detail.    Dietary, activity, and exercise recommendations along with restrictions were discussed with the patient during today's visit.    Wound care instructions were discussed during today's visit.    The patient will return to my attention on an as needed basis.    The patient is encouraged to continue seeing the primary care physician for ongoing medical needs.    The patient was given ample opportunity to ask questions.  The patient's questions were answered in detail and to the patient's satisfaction.  The patient voiced understanding of the postoperative care plan.             Sincerely,       Scotty Duffy MD   CC: No Recipients